# Patient Record
Sex: MALE | Race: WHITE | Employment: FULL TIME | URBAN - NONMETROPOLITAN AREA
[De-identification: names, ages, dates, MRNs, and addresses within clinical notes are randomized per-mention and may not be internally consistent; named-entity substitution may affect disease eponyms.]

---

## 2019-12-02 ENCOUNTER — OFFICE VISIT (OUTPATIENT)
Dept: FAMILY MEDICINE CLINIC | Age: 24
End: 2019-12-02
Payer: COMMERCIAL

## 2019-12-02 VITALS
BODY MASS INDEX: 26.61 KG/M2 | DIASTOLIC BLOOD PRESSURE: 76 MMHG | HEIGHT: 75 IN | WEIGHT: 214 LBS | OXYGEN SATURATION: 98 % | HEART RATE: 70 BPM | SYSTOLIC BLOOD PRESSURE: 130 MMHG | RESPIRATION RATE: 16 BRPM

## 2019-12-02 DIAGNOSIS — Z00.00 VISIT FOR WELL MAN HEALTH CHECK: ICD-10-CM

## 2019-12-02 DIAGNOSIS — T78.40XA ALLERGIC STATE, INITIAL ENCOUNTER: ICD-10-CM

## 2019-12-02 DIAGNOSIS — Z11.4 SCREENING FOR HIV WITHOUT PRESENCE OF RISK FACTORS: Primary | ICD-10-CM

## 2019-12-02 DIAGNOSIS — Z11.4 SCREENING FOR HIV WITHOUT PRESENCE OF RISK FACTORS: ICD-10-CM

## 2019-12-02 DIAGNOSIS — Z87.2 HISTORY OF COLD URTICARIA: ICD-10-CM

## 2019-12-02 PROCEDURE — 99385 PREV VISIT NEW AGE 18-39: CPT | Performed by: FAMILY MEDICINE

## 2019-12-02 PROCEDURE — 36415 COLL VENOUS BLD VENIPUNCTURE: CPT | Performed by: FAMILY MEDICINE

## 2019-12-02 RX ORDER — AZELASTINE HYDROCHLORIDE 0.5 MG/ML
1 SOLUTION/ DROPS OPHTHALMIC DAILY
COMMUNITY
End: 2019-12-02 | Stop reason: SDUPTHER

## 2019-12-02 RX ORDER — FLUTICASONE PROPIONATE 50 MCG
1 SPRAY, SUSPENSION (ML) NASAL DAILY
COMMUNITY
End: 2019-12-02 | Stop reason: SDUPTHER

## 2019-12-02 RX ORDER — LORATADINE 10 MG/1
10 TABLET ORAL DAILY
COMMUNITY
End: 2021-09-27 | Stop reason: SDUPTHER

## 2019-12-02 RX ORDER — FLUTICASONE PROPIONATE 50 MCG
1 SPRAY, SUSPENSION (ML) NASAL DAILY
Qty: 3 BOTTLE | Refills: 3 | Status: SHIPPED | OUTPATIENT
Start: 2019-12-02 | End: 2020-07-30 | Stop reason: SDUPTHER

## 2019-12-02 RX ORDER — AZELASTINE HYDROCHLORIDE 0.5 MG/ML
1 SOLUTION/ DROPS OPHTHALMIC DAILY PRN
Qty: 3 BOTTLE | Refills: 3 | Status: SHIPPED | OUTPATIENT
Start: 2019-12-02 | End: 2020-07-30 | Stop reason: SDUPTHER

## 2019-12-02 ASSESSMENT — ENCOUNTER SYMPTOMS
SHORTNESS OF BREATH: 0
ABDOMINAL PAIN: 0
EYE REDNESS: 0
SORE THROAT: 1
CONSTIPATION: 0
PHOTOPHOBIA: 0
EYE DISCHARGE: 0
EYE PAIN: 0
VOMITING: 0
EYE ITCHING: 0
DIARRHEA: 0
BACK PAIN: 0
COUGH: 1
NAUSEA: 0

## 2019-12-02 ASSESSMENT — PATIENT HEALTH QUESTIONNAIRE - PHQ9
SUM OF ALL RESPONSES TO PHQ QUESTIONS 1-9: 0
SUM OF ALL RESPONSES TO PHQ QUESTIONS 1-9: 0
SUM OF ALL RESPONSES TO PHQ9 QUESTIONS 1 & 2: 0
2. FEELING DOWN, DEPRESSED OR HOPELESS: 0
1. LITTLE INTEREST OR PLEASURE IN DOING THINGS: 0

## 2019-12-04 LAB — HIV-2 AB: NEGATIVE

## 2020-06-22 ENCOUNTER — OFFICE VISIT (OUTPATIENT)
Dept: FAMILY MEDICINE CLINIC | Age: 25
End: 2020-06-22
Payer: COMMERCIAL

## 2020-06-22 VITALS
HEART RATE: 63 BPM | SYSTOLIC BLOOD PRESSURE: 112 MMHG | HEIGHT: 75 IN | BODY MASS INDEX: 26.75 KG/M2 | DIASTOLIC BLOOD PRESSURE: 76 MMHG | OXYGEN SATURATION: 99 % | TEMPERATURE: 97.3 F | RESPIRATION RATE: 16 BRPM

## 2020-06-22 PROCEDURE — 99214 OFFICE O/P EST MOD 30 MIN: CPT | Performed by: NURSE PRACTITIONER

## 2020-06-22 ASSESSMENT — PATIENT HEALTH QUESTIONNAIRE - PHQ9
SUM OF ALL RESPONSES TO PHQ QUESTIONS 1-9: 0
1. LITTLE INTEREST OR PLEASURE IN DOING THINGS: 0
2. FEELING DOWN, DEPRESSED OR HOPELESS: 0
SUM OF ALL RESPONSES TO PHQ QUESTIONS 1-9: 0
SUM OF ALL RESPONSES TO PHQ9 QUESTIONS 1 & 2: 0

## 2020-06-22 NOTE — PATIENT INSTRUCTIONS
Patient Education        Chavez: Care Instructions  Your Care Instructions     Chavez--even minor ones--can be very painful. A minor burn may heal within several days, while a more serious burn may take weeks or even months to heal completely. You may notice that the burned area feels tight and hard while it is healing. It is important to continue to move the area as the burn heals to prevent loss of motion or loss of function in the area. When your skin is damaged by a burn, you have a greater risk of infection. Keep the wound clean and change the bandages regularly to prevent infection and help the burn heal.  Burns can leave permanent scars. Taking good care of the burn as it heals may help prevent bad scars. The doctor has checked you carefully, but problems can develop later. If you notice any problems or new symptoms, get medical treatment right away. Follow-up care is a key part of your treatment and safety. Be sure to make and go to all appointments, and call your doctor if you are having problems. It's also a good idea to know your test results and keep a list of the medicines you take. How can you care for yourself at home? · If your doctor told you how to care for your burn, follow your doctor's instructions. If you did not get instructions, follow this general advice:  ? Wash the burn with clean water 2 times a day. Don't use hydrogen peroxide or alcohol, which can slow healing. ? Gently pat the burn dry after you wash it.  ? You may cover the burn with a thin layer of petroleum jelly, such as Vaseline, and a nonstick bandage. ? Apply more petroleum jelly and replace the bandage as needed. · Protect your burn while it is healing. Cover your burn if you are going out in the cold or the sun. ? Wear long sleeves if the burn is on your hands or arms. ? Wear a hat if the burn is on your face. ? Wear socks and shoes if the burn is on your feet. · Do not break blisters open.  This increases the

## 2020-06-22 NOTE — PROGRESS NOTES
[address] [address]  Dept: [phone]  Dept Fax: [phone]  Loc: [phone]      Tre Glasgow is a 26 y.o. male who presents todayfor Blister (on bilateral feet- [place] )      HPI:      Patient is here with concerns of blisters to the anterior portions of his feet. He was playing sand volleyball on Saturday and they developed a few hours after. He did a Tele health visit and was told to apply OTC antibiotic cream and avoid popping the blisters. He did pop the blister on his left foot. The patient has No Known Allergies. Past MedicalHistory  Mercedez Staaditier  has a past medical history of Allergies and History of cold urticaria. Medications    Current Outpatient Medications:     silver sulfADIAZINE (SILVADENE) 1 % cream, Apply topical daily, Disp: 50 g, Rfl: 0    loratadine (ALLERGY RELIEF) 10 MG tablet, Take 10 mg by mouth daily, Disp: , Rfl:     fluticasone (FLONASE) 50 MCG/ACT nasal spray, 1 spray by Each Nostril route daily, Disp: 3 Bottle, Rfl: 3    azelastine (OPTIVAR) 0.05 % ophthalmic solution, Place 1 drop into both eyes daily as needed (allergic symptoms), Disp: 3 Bottle, Rfl: 3    Subjective:      Review of Systems   Constitutional: Negative for chills, fatigue and fever. Eyes: Negative for discharge, redness and itching. Respiratory: Negative for cough and shortness of breath. Cardiovascular: Negative for chest pain and leg swelling. Gastrointestinal: Negative for abdominal pain, nausea and vomiting. Musculoskeletal: Negative for arthralgias, back pain, gait problem and neck pain. Skin: Positive for wound.         Blisters to bottom of feet         Objective:        Vitals:    06/22/20 0933   BP: 112/76   Site: Left Upper Arm   Position: Sitting   Pulse: 63   Resp: 16   Temp: 97.3 °F (36.3 °C)   TempSrc: Temporal   SpO2: 99%   Height: 6' 3\"

## 2020-06-23 ASSESSMENT — ENCOUNTER SYMPTOMS
SHORTNESS OF BREATH: 0
EYE REDNESS: 0
VOMITING: 0
BACK PAIN: 0
ABDOMINAL PAIN: 0
EYE DISCHARGE: 0
EYE ITCHING: 0
NAUSEA: 0
COUGH: 0

## 2020-07-30 RX ORDER — AZELASTINE HYDROCHLORIDE 0.5 MG/ML
1 SOLUTION/ DROPS OPHTHALMIC DAILY PRN
Qty: 3 BOTTLE | Refills: 3 | Status: SHIPPED | OUTPATIENT
Start: 2020-07-30

## 2020-07-30 RX ORDER — FLUTICASONE PROPIONATE 50 MCG
1 SPRAY, SUSPENSION (ML) NASAL DAILY
Qty: 3 BOTTLE | Refills: 3 | Status: SHIPPED | OUTPATIENT
Start: 2020-07-30 | End: 2021-08-30

## 2020-07-30 NOTE — TELEPHONE ENCOUNTER
Burton Dickens called requesting a refill on the following medications:  Requested Prescriptions     Pending Prescriptions Disp Refills    fluticasone (FLONASE) 50 MCG/ACT nasal spray 3 Bottle 3     Si spray by Each Nostril route daily    azelastine (OPTIVAR) 0.05 % ophthalmic solution 3 Bottle 3     Sig: Place 1 drop into both eyes daily as needed (allergic symptoms)     Pharmacy verified:  Yes         Date of last visit: 20  Date of next visit (if applicable): Visit date not found

## 2020-07-31 NOTE — TELEPHONE ENCOUNTER
Patient aware and voiced understanding, no concerns voiced at this time. Pt stated that he will call the office back to schedule appt.

## 2020-10-21 ENCOUNTER — VIRTUAL VISIT (OUTPATIENT)
Dept: FAMILY MEDICINE CLINIC | Age: 25
End: 2020-10-21
Payer: COMMERCIAL

## 2020-10-21 PROCEDURE — 99442 PR PHYS/QHP TELEPHONE EVALUATION 11-20 MIN: CPT | Performed by: FAMILY MEDICINE

## 2020-10-21 RX ORDER — HYDROCORTISONE ACETATE 25 MG/1
25 SUPPOSITORY RECTAL EVERY 12 HOURS
Qty: 14 SUPPOSITORY | Refills: 0 | Status: SHIPPED | OUTPATIENT
Start: 2020-10-21 | End: 2021-09-27

## 2020-10-21 ASSESSMENT — ENCOUNTER SYMPTOMS: HEMATOCHEZIA: 1

## 2020-10-21 NOTE — PROGRESS NOTES
Jessica Ortiz is a 26 y.o. male evaluated via telephone on 10/21/2020. Consent:  He and/or health care decision maker is aware that that he may receive a bill for this telephone service, depending on his insurance coverage, and has provided verbal consent to proceed: Yes      Documentation:  I communicated with the patient and/or health care decision maker about hemorrhoids. Is getting bleeding when he uses the restroom. Also getting pain. Using OTC witch hazel wipes. Pain is better now. Has had bleeding for about a month. Only with wiping. Variable quantity. No large quantities of blood. Not sure if there is blood mixed with stools. Went to [facility] a month ago. Was approved for plasma donation so does not think he was anemic. Not dizzy or otherwise feeling unwell. Details of this discussion including any medical advice provided: Increase water and fiber in diet. Use leda bottle or bidet. Continue witch hazel, will trial topical steroid and nitroglycerin. Refer to gastroenterology. Indications for prompt return and/or emergent presentation if worsening reviewed in detail. I affirm this is a Patient Initiated Episode with a Patient who has not had a related appointment within my department in the past 7 days or scheduled within the next 24 hours.     Patient identification was verified at the start of the visit: Yes    Total Time: minutes: 11-20 minutes    Note: not billable if this call serves to triage the patient into an appointment for the relevant concern      Azucena Go

## 2021-08-29 DIAGNOSIS — T78.40XA ALLERGY, INITIAL ENCOUNTER: ICD-10-CM

## 2021-08-30 RX ORDER — FLUTICASONE PROPIONATE 50 MCG
SPRAY, SUSPENSION (ML) NASAL
Qty: 1 BOTTLE | Refills: 2 | Status: SHIPPED | OUTPATIENT
Start: 2021-08-30 | End: 2022-06-16 | Stop reason: SDUPTHER

## 2021-08-30 NOTE — TELEPHONE ENCOUNTER
Patient's last appointment was : 10/21/2020  Patient's next appointment is : Visit date not found  Last refilled:  7/30/21

## 2021-09-27 ENCOUNTER — OFFICE VISIT (OUTPATIENT)
Dept: FAMILY MEDICINE CLINIC | Age: 26
End: 2021-09-27
Payer: COMMERCIAL

## 2021-09-27 VITALS
TEMPERATURE: 98.4 F | RESPIRATION RATE: 14 BRPM | HEART RATE: 59 BPM | SYSTOLIC BLOOD PRESSURE: 126 MMHG | BODY MASS INDEX: 27.05 KG/M2 | DIASTOLIC BLOOD PRESSURE: 82 MMHG | OXYGEN SATURATION: 98 % | WEIGHT: 216.4 LBS

## 2021-09-27 DIAGNOSIS — Z00.00 ROUTINE ADULT HEALTH MAINTENANCE: ICD-10-CM

## 2021-09-27 DIAGNOSIS — T78.40XA ALLERGY, INITIAL ENCOUNTER: Primary | ICD-10-CM

## 2021-09-27 PROCEDURE — 99395 PREV VISIT EST AGE 18-39: CPT | Performed by: FAMILY MEDICINE

## 2021-09-27 PROCEDURE — 90471 IMMUNIZATION ADMIN: CPT | Performed by: FAMILY MEDICINE

## 2021-09-27 PROCEDURE — 90715 TDAP VACCINE 7 YRS/> IM: CPT | Performed by: FAMILY MEDICINE

## 2021-09-27 PROCEDURE — 90674 CCIIV4 VAC NO PRSV 0.5 ML IM: CPT | Performed by: FAMILY MEDICINE

## 2021-09-27 PROCEDURE — 90472 IMMUNIZATION ADMIN EACH ADD: CPT | Performed by: FAMILY MEDICINE

## 2021-09-27 RX ORDER — LORATADINE 10 MG/1
10 TABLET ORAL DAILY
Qty: 90 TABLET | Refills: 3 | Status: SHIPPED | OUTPATIENT
Start: 2021-09-27

## 2021-09-27 ASSESSMENT — PATIENT HEALTH QUESTIONNAIRE - PHQ9
1. LITTLE INTEREST OR PLEASURE IN DOING THINGS: 0
SUM OF ALL RESPONSES TO PHQ QUESTIONS 1-9: 0
2. FEELING DOWN, DEPRESSED OR HOPELESS: 0
SUM OF ALL RESPONSES TO PHQ9 QUESTIONS 1 & 2: 0

## 2021-09-27 ASSESSMENT — ENCOUNTER SYMPTOMS
NAUSEA: 0
DIARRHEA: 0
ABDOMINAL DISTENTION: 0
BLOOD IN STOOL: 0
CHEST TIGHTNESS: 0
VOMITING: 0
ABDOMINAL PAIN: 0
SORE THROAT: 0
CONSTIPATION: 0
ANAL BLEEDING: 0
COUGH: 0
SHORTNESS OF BREATH: 0

## 2021-09-27 NOTE — PROGRESS NOTES
After obtaining consent, and per orders of Dr. Genet Lawton, injection of Tdap LD and influenza RD given by Marie Ugalde MA. Patient instructed to remain in clinic for 20 minutes afterwards, and to report any adverse reaction to me immediately. Immunizations Administered     Name Date Dose Route    Influenza, MDCK Quadv, IM, PF (Flucelvax 2 yrs and older) 9/27/2021 0.5 mL Intramuscular    Site: Deltoid- Right    Lot: [id number]    NDC: 80670-351-17    Tdap (Boostrix, Adacel) 9/27/2021 0.5 mL Intramuscular    Site: Deltoid- Left    Lot: [id number]    NDC: 15465-279-51        VIS sheets given to patients- vaccine checklist completed.

## 2021-09-27 NOTE — PROGRESS NOTES
[address] [address]  Dept: [phone]  Dept Fax: [phone]  Loc: [phone]      Luis Cobb is a 23 y.o. male who presents todayfor his medical conditions/complaints as noted below. Luis Cobb is c/o of Annual Exam      :     Patient has no complaints today. He would like to get his Tetanus booster and flu shot. Patient had questions regarding COVID vaccination. He was hesitant to get it due to the \"politics\". Patient was requesting refills for his Loratadine as that he takes that every day for his allergies. Patient Active Problem List   Diagnosis    History of cold urticaria    Allergies     Goals    None       The patient has No Known Allergies. Medical History  José Pond has a past medical history of Allergies and History of cold urticaria. Past SurgicalHistory  The patient  has a past surgical history that includes Tonsillectomy and adenoidectomy. Family History  This patient's family history includes Arthritis in his maternal grandfather; High Blood Pressure in his father. Social History  José Pond  reports that he has never smoked. He has never used smokeless tobacco. He reports current alcohol use. He reports that he does not use drugs. Medications    Current Outpatient Medications:     loratadine (ALLERGY RELIEF) 10 MG tablet, Take 1 tablet by mouth daily, Disp: 90 tablet, Rfl: 3    fluticasone (FLONASE) 50 MCG/ACT nasal spray, INHALE 1 SPRAY INTO EACH NOSTRIL EVERY DAY, Disp: 1 Bottle, Rfl: 2    azelastine (OPTIVAR) 0.05 % ophthalmic solution, Place 1 drop into both eyes daily as needed (allergic symptoms), Disp: 3 Bottle, Rfl: 3    Subjective:      Review of Systems   Constitutional: Negative for fatigue, fever and unexpected weight change. HENT: Negative for ear pain, hearing loss and sore throat. Eyes: Negative for visual disturbance.    Respiratory: Negative for cough, chest tightness and shortness of breath. Cardiovascular: Negative for chest pain and palpitations. Gastrointestinal: Negative for abdominal distention, abdominal pain, anal bleeding, blood in stool, constipation, diarrhea, nausea and vomiting. Endocrine: Negative. Genitourinary: Negative for dysuria, enuresis and flank pain. Musculoskeletal: Negative for arthralgias, joint swelling, neck pain and neck stiffness. Neurological: Negative for dizziness, weakness, light-headedness and headaches. Psychiatric/Behavioral: Negative. Objective:     Vitals:    09/27/21 1636   BP: 126/82   Site: Left Upper Arm   Pulse: 59   Resp: 14   Temp: 98.4 °F (36.9 °C)   TempSrc: Skin   SpO2: 98%   Weight: 216 lb 6.4 oz (98.2 kg)       Physical Exam  Constitutional:       General: He is not in acute distress. Appearance: Normal appearance. He is not ill-appearing. HENT:      Head: Normocephalic and atraumatic. Nose: Nose normal.      Mouth/Throat:      Pharynx: No oropharyngeal exudate. Eyes:      General:         Right eye: No discharge. Left eye: No discharge. Extraocular Movements: Extraocular movements intact. Conjunctiva/sclera: Conjunctivae normal.      Pupils: Pupils are equal, round, and reactive to light. Cardiovascular:      Rate and Rhythm: Normal rate and regular rhythm. Pulses: Normal pulses. Heart sounds: Normal heart sounds. No murmur heard. No gallop. Pulmonary:      Effort: Pulmonary effort is normal. No respiratory distress. Breath sounds: Normal breath sounds. No stridor. No wheezing, rhonchi or rales. Abdominal:      General: Abdomen is flat. Bowel sounds are normal. There is no distension. Palpations: Abdomen is soft. Tenderness: There is no abdominal tenderness. There is no guarding. Musculoskeletal:         General: Normal range of motion. Cervical back: Normal range of motion and neck supple. Right lower leg: No edema. personally performing the exam and medical decision making. I verify the accuracy of the documentation by the resident with the following addition or changes: Doing well today. No concerns. Exam and vitals reassuring. Updated flu and TDap vaccines. Encouraged COVID-19 vaccine. He has concerns that it is \"political\". Of course many prominent politicians including Yanely Patterson and Scar Rosario are vaccinated even though they support personal choice in this regard. Recommended vaccine since I have seen less severe health outcomes in vaccinated patients.         Electronically signed by Williams Mirza MD on 9/27/2021 at 5:19 PM

## 2022-06-15 DIAGNOSIS — T78.40XA ALLERGY, INITIAL ENCOUNTER: ICD-10-CM

## 2022-06-15 RX ORDER — FLUTICASONE PROPIONATE 50 MCG
1 SPRAY, SUSPENSION (ML) NASAL DAILY
Status: CANCELLED | OUTPATIENT
Start: 2022-06-15

## 2022-06-15 NOTE — TELEPHONE ENCOUNTER
Pt is asking for refill on nasal spray  Fluticason  [facility] Supriya ipna- permanent change please    Patient's last appointment was : 9/27/2021  Patient's next appointment is : 10/3/2022  Last refilled:

## 2022-06-16 DIAGNOSIS — T78.40XA ALLERGY, INITIAL ENCOUNTER: ICD-10-CM

## 2022-06-16 RX ORDER — FLUTICASONE PROPIONATE 50 MCG
2 SPRAY, SUSPENSION (ML) NASAL DAILY
Qty: 1 EACH | Refills: 3 | Status: SHIPPED | OUTPATIENT
Start: 2022-06-16

## 2022-11-28 ENCOUNTER — OFFICE VISIT (OUTPATIENT)
Dept: FAMILY MEDICINE CLINIC | Age: 27
End: 2022-11-28
Payer: COMMERCIAL

## 2022-11-28 VITALS
BODY MASS INDEX: 26.33 KG/M2 | RESPIRATION RATE: 16 BRPM | OXYGEN SATURATION: 99 % | WEIGHT: 211.8 LBS | HEART RATE: 66 BPM | HEIGHT: 75 IN | SYSTOLIC BLOOD PRESSURE: 110 MMHG | DIASTOLIC BLOOD PRESSURE: 64 MMHG

## 2022-11-28 DIAGNOSIS — Z11.59 NEED FOR HEPATITIS C SCREENING TEST: ICD-10-CM

## 2022-11-28 DIAGNOSIS — Z23 NEED FOR COVID-19 VACCINE: ICD-10-CM

## 2022-11-28 DIAGNOSIS — Z00.00 ROUTINE ADULT HEALTH MAINTENANCE: ICD-10-CM

## 2022-11-28 PROCEDURE — 99395 PREV VISIT EST AGE 18-39: CPT | Performed by: FAMILY MEDICINE

## 2022-11-28 ASSESSMENT — ENCOUNTER SYMPTOMS
PHOTOPHOBIA: 0
WHEEZING: 0
CONSTIPATION: 0
NAUSEA: 0
VOMITING: 0
BLOOD IN STOOL: 0
COUGH: 0
DIARRHEA: 0

## 2022-11-28 ASSESSMENT — PATIENT HEALTH QUESTIONNAIRE - PHQ9
SUM OF ALL RESPONSES TO PHQ QUESTIONS 1-9: 0
2. FEELING DOWN, DEPRESSED OR HOPELESS: 0
SUM OF ALL RESPONSES TO PHQ9 QUESTIONS 1 & 2: 0
SUM OF ALL RESPONSES TO PHQ QUESTIONS 1-9: 0
1. LITTLE INTEREST OR PLEASURE IN DOING THINGS: 0

## 2022-11-28 NOTE — PROGRESS NOTES
Attending attestation:  I personally performed and participated key or critical portions of the evaluation and management including personally performing the exam and medical decision making. I verify the accuracy of the documentation by the resident with the following addition or changes:  Here for well check today. Seeing allergy/immunology. Allergy shots are helping. Getting monthly. Also still on other medications with allergy/immunology. Did a cluster build up initially. Had hives with one shot. GI bleeding is resolved with high fiber diet. Scope was negative. Got COVID-19 vaccine for work. Declining booster. I do encourage this. Had a girlfriend a month ago and was sexually active. No current symptoms. Discussed testing for STIs. I would recommend testing between partners. Will consider this. If messages me requesting screening and no symptoms would order testing without a visit.        Electronically signed by Barbi Cevallos MD on 11/28/2022 at 5:34 PM

## 2022-11-28 NOTE — PROGRESS NOTES
[address] [address]  Dept: [phone]  Dept Fax: [phone]  Loc: [phone]      Marcia Gentile is a 33 y.o. male who presents todayfor his medical conditions/complaints as noted below. Marcia Gentile is c/o of Annual Exam      :     Marcia Gentile is a 33 y.o. male who presents for annual well check. He has no acute complaints. He wants to know if he is up to date on his vaccines and if he will need a repeat colonoscopy. His last one was normal and GI did not give him a follow up recommendation. His history is significant for allergies which he follow with an allergist.    Patient Active Problem List   Diagnosis    History of cold urticaria    Allergies      Goals    None       The patient is allergic to dust mite extract. Medical History  Emily Benoit has a past medical history of Allergies and History of cold urticaria. Past SurgicalHistory  The patient  has a past surgical history that includes Tonsillectomy and adenoidectomy. Family History  This patient's family history includes Arthritis in his maternal grandfather; High Blood Pressure in his father. Social History  Emily Benoit  reports that he has never smoked. He has never used smokeless tobacco. He reports current alcohol use. He reports that he does not use drugs. Medications    Current Outpatient Medications:     fluticasone (FLONASE) 50 MCG/ACT nasal spray, 2 sprays by Nasal route daily, Disp: 1 each, Rfl: 3    azelastine (OPTIVAR) 0.05 % ophthalmic solution, Place 1 drop into both eyes daily as needed (allergic symptoms), Disp: 3 Bottle, Rfl: 3    Subjective:      Review of Systems   Constitutional:  Negative for activity change, appetite change, chills, fatigue and fever. HENT:  Negative for dental problem, hearing loss and sneezing. Eyes:  Negative for photophobia and visual disturbance. Respiratory:  Negative for cough and wheezing. Cardiovascular:  Negative for chest pain and palpitations. Gastrointestinal:  Negative for blood in stool, constipation, diarrhea, nausea and vomiting. Genitourinary:  Negative for difficulty urinating, dysuria and hematuria. Musculoskeletal:  Negative for gait problem, neck pain and neck stiffness. Skin:  Negative for rash and wound. Allergic/Immunologic: Positive for environmental allergies. Negative for immunocompromised state. Neurological:  Negative for dizziness, seizures, weakness and headaches. Hematological:  Does not bruise/bleed easily. Psychiatric/Behavioral:  Negative for behavioral problems and dysphoric mood. The patient is not nervous/anxious. Objective:     Vitals:    11/28/22 1636   BP: 110/64   Site: Left Upper Arm   Position: Sitting   Cuff Size: Medium Adult   Pulse: 66   Resp: 16   SpO2: 99%   Weight: 211 lb 12.8 oz (96.1 kg)   Height: 6' 3\" (1.905 m)       Physical Exam  Constitutional:       General: He is not in acute distress. Appearance: Normal appearance. He is not ill-appearing, toxic-appearing or diaphoretic. HENT:      Head: Normocephalic and atraumatic. Nose: Nose normal. No rhinorrhea. Mouth/Throat:      Mouth: Mucous membranes are moist.      Pharynx: Oropharynx is clear. Eyes:      General: No scleral icterus. Extraocular Movements: Extraocular movements intact. Cardiovascular:      Rate and Rhythm: Normal rate and regular rhythm. Pulses: Normal pulses. Heart sounds: Normal heart sounds. No murmur heard. No friction rub. No gallop. Pulmonary:      Effort: Pulmonary effort is normal. No respiratory distress. Breath sounds: Normal breath sounds. No wheezing, rhonchi or rales. Abdominal:      General: Abdomen is flat. Palpations: Abdomen is soft. Tenderness: There is no abdominal tenderness. Musculoskeletal:         General: No swelling, tenderness, deformity or signs of injury. Normal range of motion. Cervical back: Normal range of motion. No rigidity or tenderness. Skin:     General: Skin is warm and dry. Coloration: Skin is not jaundiced or pale. Findings: No bruising or rash. Neurological:      General: No focal deficit present. Mental Status: He is alert and oriented to person, place, and time. Motor: No weakness. Coordination: Coordination normal.      Gait: Gait normal.   Psychiatric:         Mood and Affect: Mood normal.         Behavior: Behavior normal.         Thought Content: Thought content normal.         Judgment: Judgment normal.       No results found for: WBC, HGB, HCT, PLT, CHOL, TRIG, HDL, LDLDIRECT, ALT, AST, NA, K, CL, CREATININE, BUN, CO2, TSH, PSA, INR, GLUF, LABA1C, LABMICR    /Plan:   1. Routine adult health maintenance  Well man. 2. Need for hepatitis C screening test  Discussed with patient the benefits of hepatitis screening. Patient declined today but may revisit screening as indicated. 3. Need for COVID-19 vaccine  Discussed with patient the benefits of COVID 19 booster. Patient declined today but may revisit topic at a later time. Return in about 1 year (around 11/28/2023) for Annual wellness visit. Orders Placed   No orders of the defined types were placed in this encounter. Prescriptions given/sent  No orders of the defined types were placed in this encounter. Patient given educational materials - see patient instructions. Discussed use, benefit, and side effects of recommended medications. All patient questions answered. Pt voiced understanding. Reviewed health maintenance. On this date 11/28/2022 I have spent 30 minutes reviewing previous notes, test results and face to face with the patient discussing the diagnosis and importance of compliance with the treatment plan as well as documenting on the day of the visit.   Electronically signed by Karlene Patino DO on 11/28/2022 at 6:17 PM

## 2023-06-16 DIAGNOSIS — Z11.59 ENCOUNTER FOR HEPATITIS C SCREENING TEST FOR LOW RISK PATIENT: ICD-10-CM

## 2023-06-16 LAB — HCV IGG SERPL QL IA: NEGATIVE

## 2023-07-02 ENCOUNTER — PATIENT MESSAGE (OUTPATIENT)
Dept: FAMILY MEDICINE CLINIC | Age: 28
End: 2023-07-02

## 2023-07-03 NOTE — TELEPHONE ENCOUNTER
From: Kita Bull  To: Sheridan Roy  Sent: 7/2/2023 6:26 PM EDT  Subject: Celexa effect - 2 week summary    Aydin Burton,     Below is my 2 week summary after being on 10mg/day of celexa. I definitely noticed effects immediately after taking and most have continued. The dealbreaker for me is the sexual side effect under \"cons\" below. I'm open to their being other causes if you have suggestions, but I'm fairly certain this is directly related to the drug. I also don't like feeling less sharp/quick as usual. I understand the trade off of this is people more easily understand/follow me and I'm less generally burned out, and I have gotten a little faster, but still not my norm mentally. - Neutral   - ~1hr after taking it is bed time whether I like it or not lol. I just take right before going to bed. - couple hours after taking and I experienced nausea but it didn't really bother me like I could go to sleep with it and when I woke up in the morning it was no problem. This has since subsided, I don't experience nausea anymore   - I seem to not be as burnt out as I usually am on fridays. Likely since I can't use anxiety to \"my benefit\" throughout the week like overclocking myself. - Only 2 instances of dissociation throughout the past 2 weeks, which is good as they were quite stressful.

## 2023-07-11 ENCOUNTER — OFFICE VISIT (OUTPATIENT)
Dept: FAMILY MEDICINE CLINIC | Age: 28
End: 2023-07-11
Payer: COMMERCIAL

## 2023-07-11 VITALS
HEART RATE: 54 BPM | DIASTOLIC BLOOD PRESSURE: 80 MMHG | OXYGEN SATURATION: 98 % | BODY MASS INDEX: 26.28 KG/M2 | SYSTOLIC BLOOD PRESSURE: 132 MMHG | HEIGHT: 75 IN | WEIGHT: 211.4 LBS | RESPIRATION RATE: 18 BRPM

## 2023-07-11 DIAGNOSIS — F41.9 SEVERE ANXIETY: Primary | ICD-10-CM

## 2023-07-11 PROCEDURE — 99214 OFFICE O/P EST MOD 30 MIN: CPT | Performed by: NURSE PRACTITIONER

## 2023-07-11 RX ORDER — SOFT LENS ADJUNCTIVE SOLUTIONS
DROPS OPHTHALMIC (EYE)
COMMUNITY

## 2023-07-11 ASSESSMENT — ANXIETY QUESTIONNAIRES
7. FEELING AFRAID AS IF SOMETHING AWFUL MIGHT HAPPEN: 1
3. WORRYING TOO MUCH ABOUT DIFFERENT THINGS: 2
4. TROUBLE RELAXING: 2
2. NOT BEING ABLE TO STOP OR CONTROL WORRYING: 2
6. BECOMING EASILY ANNOYED OR IRRITABLE: 2
1. FEELING NERVOUS, ANXIOUS, OR ON EDGE: 3
GAD7 TOTAL SCORE: 12
5. BEING SO RESTLESS THAT IT IS HARD TO SIT STILL: 0

## 2023-07-11 ASSESSMENT — ENCOUNTER SYMPTOMS
COUGH: 0
TROUBLE SWALLOWING: 0
SINUS PRESSURE: 0
WHEEZING: 0
FACIAL SWELLING: 0
SINUS PAIN: 0
CHEST TIGHTNESS: 0
SORE THROAT: 0

## 2023-07-17 ASSESSMENT — ENCOUNTER SYMPTOMS: GASTROINTESTINAL NEGATIVE: 1

## 2023-07-31 ENCOUNTER — PATIENT MESSAGE (OUTPATIENT)
Dept: FAMILY MEDICINE CLINIC | Age: 28
End: 2023-07-31

## 2023-07-31 DIAGNOSIS — F41.9 SEVERE ANXIETY: ICD-10-CM

## 2023-07-31 NOTE — TELEPHONE ENCOUNTER
From: Teresa Ballesteros  To: Antonieta Leachn  Sent: 7/31/2023 10:36 AM EDT  Subject: Theoretical celexa dosage increase    Aydin Burton,    Could you walk me through what it would look like if we increased my dosage? Would I need to come in for you to do that or could you just write me another script? Would I split pills or would I need whole new prescription? If the dosage was too much for me would I be able to easily step down? Long story short, I think I may need to increase. Should I see side effects return? Open to any advice/opinions, thank you for considering this!

## 2023-08-01 RX ORDER — CITALOPRAM 20 MG/1
20 TABLET ORAL DAILY
Qty: 90 TABLET | Refills: 3 | Status: SHIPPED | OUTPATIENT
Start: 2023-08-01 | End: 2024-07-31

## 2023-08-14 ENCOUNTER — PATIENT MESSAGE (OUTPATIENT)
Dept: FAMILY MEDICINE CLINIC | Age: 28
End: 2023-08-14

## 2023-08-17 NOTE — TELEPHONE ENCOUNTER
From: Christy Lehman  To: Tha Decker  Sent: 8/14/2023 12:58 PM EDT  Subject: Citalopram    Hello Josephine,  So far the increase has been a very good thing. Right when I upped the dose things just got clearer and focus was easier and things were quieter in my head. None of the side effects that I previously had returned. Today I have had a few depersonalization experiences (only lasting a few moments) which is an abnormal amount for me. One additional that I remember from this past weekend. I am sure the depersonalization comes from coming back to work lol, but curious if the drug has any effect as well, positive or negative. Does citalopram help cure these or does it cause more of these? Assuming it depends person to person, but I'm curious on the average effects.     Rudolph Pfeiffer

## 2023-08-31 ENCOUNTER — OFFICE VISIT (OUTPATIENT)
Dept: FAMILY MEDICINE CLINIC | Age: 28
End: 2023-08-31
Payer: COMMERCIAL

## 2023-08-31 ENCOUNTER — PATIENT MESSAGE (OUTPATIENT)
Dept: FAMILY MEDICINE CLINIC | Age: 28
End: 2023-08-31

## 2023-08-31 VITALS
BODY MASS INDEX: 26.53 KG/M2 | RESPIRATION RATE: 16 BRPM | WEIGHT: 213.4 LBS | HEART RATE: 58 BPM | OXYGEN SATURATION: 98 % | SYSTOLIC BLOOD PRESSURE: 118 MMHG | DIASTOLIC BLOOD PRESSURE: 84 MMHG | HEIGHT: 75 IN

## 2023-08-31 DIAGNOSIS — F41.9 SEVERE ANXIETY: Primary | ICD-10-CM

## 2023-08-31 PROCEDURE — 99214 OFFICE O/P EST MOD 30 MIN: CPT | Performed by: NURSE PRACTITIONER

## 2023-08-31 RX ORDER — AZELASTINE HYDROCHLORIDE 137 UG/1
SPRAY, METERED NASAL
COMMUNITY

## 2023-08-31 ASSESSMENT — ANXIETY QUESTIONNAIRES
7. FEELING AFRAID AS IF SOMETHING AWFUL MIGHT HAPPEN: 1
2. NOT BEING ABLE TO STOP OR CONTROL WORRYING: 1
1. FEELING NERVOUS, ANXIOUS, OR ON EDGE: 1
4. TROUBLE RELAXING: 1
IF YOU CHECKED OFF ANY PROBLEMS ON THIS QUESTIONNAIRE, HOW DIFFICULT HAVE THESE PROBLEMS MADE IT FOR YOU TO DO YOUR WORK, TAKE CARE OF THINGS AT HOME, OR GET ALONG WITH OTHER PEOPLE: SOMEWHAT DIFFICULT
6. BECOMING EASILY ANNOYED OR IRRITABLE: 1
5. BEING SO RESTLESS THAT IT IS HARD TO SIT STILL: 2
3. WORRYING TOO MUCH ABOUT DIFFERENT THINGS: 1
GAD7 TOTAL SCORE: 8

## 2023-08-31 ASSESSMENT — ENCOUNTER SYMPTOMS
WHEEZING: 0
GASTROINTESTINAL NEGATIVE: 1
COUGH: 0
SINUS PRESSURE: 0
CHEST TIGHTNESS: 0
FACIAL SWELLING: 0
SORE THROAT: 0
SINUS PAIN: 0
TROUBLE SWALLOWING: 0

## 2023-08-31 NOTE — PROGRESS NOTES
[address] [facility]   [facility],[address] [address]  Dept: [phone]  Loc: [phone]    Yamini Aguilera (:  1995) is a 33 y.o. male,Established patient, here for evaluation of the following chief complaint(s): Anxiety (Feels anxiety is improved, has been doing very well. Continues on Citalopram.) and Follow-up      ASSESSMENT/PLAN:  1. Severe anxiety  Doing well  Continue Celexa 20 mg daily. Patient feels he may need an increase in Celexa dose, but will message myself in 2 weeks, after allowing current dose more time to become therapeutic. GAD7-8 today 12 last month and 15 2 months.  - [place] for hiking trip. Time spent -35 minutes. Return for update via [employer] in 1-2 weeks on celexa. SUBJECTIVE/OBJECTIVE:  Patient presents with: Anxiety: Started on Celexa will discuss pros/cons with PCP             has a past medical history of Allergies and History of cold urticaria. Review of Systems   Constitutional:  Negative for appetite change, diaphoresis, fatigue and fever. HENT:  Negative for congestion, dental problem, ear pain, facial swelling, mouth sores, postnasal drip, sinus pressure, sinus pain, sore throat and trouble swallowing. Respiratory:  Negative for cough, chest tightness and wheezing. Gastrointestinal: Negative. Genitourinary: Negative. Musculoskeletal: Negative. Skin: Negative. Neurological: Negative. Psychiatric/Behavioral:  Negative for agitation and dysphoric mood. The patient is not nervous/anxious. Physical Exam  Vitals and nursing note reviewed. Constitutional:       General: He is not in acute distress. Appearance: Normal appearance. He is well-developed. He is not ill-appearing or diaphoretic. HENT:      Head: Normocephalic and atraumatic.       Right Ear: Tympanic membrane, ear canal and external ear normal. Tympanic membrane is not injected or

## 2023-09-01 NOTE — TELEPHONE ENCOUNTER
From: Estefani Chan  To: Abimael Brown  Sent: 8/31/2023 5:54 PM EDT  Subject: Arelia Plana cortisone substitute?     Rommel Burton, what do you think of this cream? Like an itch cream?

## 2023-09-07 ENCOUNTER — PATIENT MESSAGE (OUTPATIENT)
Dept: FAMILY MEDICINE CLINIC | Age: 28
End: 2023-09-07

## 2023-11-27 ENCOUNTER — PATIENT MESSAGE (OUTPATIENT)
Dept: FAMILY MEDICINE CLINIC | Age: 28
End: 2023-11-27

## 2023-12-27 ENCOUNTER — OFFICE VISIT (OUTPATIENT)
Dept: FAMILY MEDICINE CLINIC | Age: 28
End: 2023-12-27
Payer: COMMERCIAL

## 2023-12-27 VITALS
BODY MASS INDEX: 27.75 KG/M2 | HEART RATE: 58 BPM | WEIGHT: 223.2 LBS | SYSTOLIC BLOOD PRESSURE: 134 MMHG | DIASTOLIC BLOOD PRESSURE: 78 MMHG | OXYGEN SATURATION: 99 % | HEIGHT: 75 IN | RESPIRATION RATE: 18 BRPM

## 2023-12-27 DIAGNOSIS — F41.9 SEVERE ANXIETY: Primary | ICD-10-CM

## 2023-12-27 PROCEDURE — 99214 OFFICE O/P EST MOD 30 MIN: CPT | Performed by: NURSE PRACTITIONER

## 2023-12-27 RX ORDER — CITALOPRAM 40 MG/1
40 TABLET ORAL DAILY
Qty: 90 TABLET | Refills: 3 | Status: SHIPPED | OUTPATIENT
Start: 2023-12-27 | End: 2024-12-26

## 2024-02-21 ENCOUNTER — PATIENT MESSAGE (OUTPATIENT)
Dept: FAMILY MEDICINE CLINIC | Age: 29
End: 2024-02-21

## 2024-02-21 NOTE — TELEPHONE ENCOUNTER
From: Markus Joyner  To: Katya Parks  Sent: 2/21/2024 11:29 AM EST  Subject: Pit sweat     Hi Josephine,    So it seems that ever since I upped my dosage of citalopram to the max of 40 mg per day my pit sweat has been in unmanageable. I am still trying switching deodorant and application times but to no avail.     Is there a medical way to handle this? I am not 100% sure of the citalopram being the cause as it wasn't a problem on 30mg.     Open to any advice and suggestions. Lmk what you think and have a good day!

## 2024-06-05 ENCOUNTER — PATIENT MESSAGE (OUTPATIENT)
Dept: FAMILY MEDICINE CLINIC | Age: 29
End: 2024-06-05

## 2024-06-05 NOTE — TELEPHONE ENCOUNTER
From: Markus Joyner  To: Katya Parks  Sent: 6/5/2024 3:06 PM EDT  Subject: Psychiatry    Hello Josephine,     I believe I need to pursue greater anxiety medication. I had a reccommendation from my counselor for Dr. Tejada at  psychiatry in [facility]. This is also local to my house which is nice.     I remember you saying that going to a specialist is where we would likely go if what I'm on wasn't working. If this is still the case am I still good to proceed this way? And if I need a referral would you be able to refer me?     Feel free to ask any questions as you see fit.

## 2024-09-23 ENCOUNTER — OFFICE VISIT (OUTPATIENT)
Dept: FAMILY MEDICINE CLINIC | Age: 29
End: 2024-09-23
Payer: COMMERCIAL

## 2024-09-23 VITALS
HEART RATE: 60 BPM | SYSTOLIC BLOOD PRESSURE: 122 MMHG | WEIGHT: 226.2 LBS | HEIGHT: 75 IN | DIASTOLIC BLOOD PRESSURE: 84 MMHG | TEMPERATURE: 97.8 F | BODY MASS INDEX: 28.12 KG/M2 | RESPIRATION RATE: 16 BRPM | OXYGEN SATURATION: 96 %

## 2024-09-23 DIAGNOSIS — J06.9 VIRAL URI WITH COUGH: Primary | ICD-10-CM

## 2024-09-23 DIAGNOSIS — J30.2 SEASONAL ALLERGIES: ICD-10-CM

## 2024-09-23 PROCEDURE — 99213 OFFICE O/P EST LOW 20 MIN: CPT | Performed by: STUDENT IN AN ORGANIZED HEALTH CARE EDUCATION/TRAINING PROGRAM

## 2024-09-23 RX ORDER — CETIRIZINE HYDROCHLORIDE 10 MG/1
10 TABLET ORAL DAILY
COMMUNITY

## 2024-09-23 RX ORDER — FLUVOXAMINE MALEATE 50 MG
50 TABLET ORAL NIGHTLY
COMMUNITY

## 2024-09-23 RX ORDER — BENZONATATE 100 MG/1
100 CAPSULE ORAL 3 TIMES DAILY PRN
Qty: 30 CAPSULE | Refills: 0 | Status: SHIPPED | OUTPATIENT
Start: 2024-09-23 | End: 2024-10-03

## 2024-09-23 ASSESSMENT — ENCOUNTER SYMPTOMS
ABDOMINAL PAIN: 0
RHINORRHEA: 1
NAUSEA: 0
DIARRHEA: 0
VOMITING: 0
COUGH: 1
SINUS PRESSURE: 0
SORE THROAT: 0
WHEEZING: 0
SHORTNESS OF BREATH: 0

## 2024-09-23 ASSESSMENT — PATIENT HEALTH QUESTIONNAIRE - PHQ9
SUM OF ALL RESPONSES TO PHQ QUESTIONS 1-9: 0
SUM OF ALL RESPONSES TO PHQ QUESTIONS 1-9: 0
SUM OF ALL RESPONSES TO PHQ9 QUESTIONS 1 & 2: 0
SUM OF ALL RESPONSES TO PHQ QUESTIONS 1-9: 0
SUM OF ALL RESPONSES TO PHQ QUESTIONS 1-9: 0
2. FEELING DOWN, DEPRESSED OR HOPELESS: NOT AT ALL
1. LITTLE INTEREST OR PLEASURE IN DOING THINGS: NOT AT ALL